# Patient Record
Sex: MALE | Race: WHITE | NOT HISPANIC OR LATINO
[De-identification: names, ages, dates, MRNs, and addresses within clinical notes are randomized per-mention and may not be internally consistent; named-entity substitution may affect disease eponyms.]

---

## 2017-04-04 ENCOUNTER — APPOINTMENT (OUTPATIENT)
Dept: PULMONOLOGY | Facility: CLINIC | Age: 77
End: 2017-04-04

## 2017-04-04 VITALS
HEART RATE: 59 BPM | SYSTOLIC BLOOD PRESSURE: 130 MMHG | BODY MASS INDEX: 30.65 KG/M2 | DIASTOLIC BLOOD PRESSURE: 70 MMHG | OXYGEN SATURATION: 92 % | WEIGHT: 226 LBS

## 2017-04-04 DIAGNOSIS — R91.8 OTHER NONSPECIFIC ABNORMAL FINDING OF LUNG FIELD: ICD-10-CM

## 2017-05-11 ENCOUNTER — APPOINTMENT (OUTPATIENT)
Dept: PULMONOLOGY | Facility: CLINIC | Age: 77
End: 2017-05-11

## 2017-05-11 VITALS — SYSTOLIC BLOOD PRESSURE: 124 MMHG | DIASTOLIC BLOOD PRESSURE: 66 MMHG | OXYGEN SATURATION: 95 % | HEART RATE: 61 BPM

## 2017-05-11 VITALS — WEIGHT: 223 LBS | BODY MASS INDEX: 30.24 KG/M2

## 2017-08-08 ENCOUNTER — APPOINTMENT (OUTPATIENT)
Dept: PULMONOLOGY | Facility: CLINIC | Age: 77
End: 2017-08-08

## 2017-08-10 DIAGNOSIS — Z78.9 OTHER SPECIFIED HEALTH STATUS: ICD-10-CM

## 2017-08-10 DIAGNOSIS — M10.9 GOUT, UNSPECIFIED: ICD-10-CM

## 2017-08-29 PROBLEM — M10.9 GOUT: Status: ACTIVE | Noted: 2017-08-29

## 2017-09-12 ENCOUNTER — APPOINTMENT (OUTPATIENT)
Dept: NEUROLOGY | Facility: CLINIC | Age: 77
End: 2017-09-12
Payer: MEDICARE

## 2017-09-12 VITALS
BODY MASS INDEX: 29.8 KG/M2 | SYSTOLIC BLOOD PRESSURE: 115 MMHG | HEIGHT: 72 IN | DIASTOLIC BLOOD PRESSURE: 51 MMHG | WEIGHT: 220 LBS

## 2017-09-12 PROCEDURE — 99215 OFFICE O/P EST HI 40 MIN: CPT

## 2017-09-18 ENCOUNTER — OUTPATIENT (OUTPATIENT)
Dept: OUTPATIENT SERVICES | Facility: HOSPITAL | Age: 77
LOS: 1 days | End: 2017-09-18
Payer: MEDICARE

## 2017-09-18 DIAGNOSIS — M54.17 RADICULOPATHY, LUMBOSACRAL REGION: ICD-10-CM

## 2017-09-18 DIAGNOSIS — Z51.89 ENCOUNTER FOR OTHER SPECIFIED AFTERCARE: ICD-10-CM

## 2017-09-18 DIAGNOSIS — M79.2 NEURALGIA AND NEURITIS, UNSPECIFIED: ICD-10-CM

## 2017-10-23 PROCEDURE — 97140 MANUAL THERAPY 1/> REGIONS: CPT

## 2017-10-23 PROCEDURE — 97010 HOT OR COLD PACKS THERAPY: CPT

## 2017-10-23 PROCEDURE — 97163 PT EVAL HIGH COMPLEX 45 MIN: CPT

## 2017-10-23 PROCEDURE — 97750 PHYSICAL PERFORMANCE TEST: CPT

## 2017-10-23 PROCEDURE — 97535 SELF CARE MNGMENT TRAINING: CPT

## 2017-10-23 PROCEDURE — G8988: CPT | Mod: CJ

## 2017-10-23 PROCEDURE — G8978: CPT | Mod: CK

## 2017-10-23 PROCEDURE — 97165 OT EVAL LOW COMPLEX 30 MIN: CPT

## 2017-10-23 PROCEDURE — G8987: CPT | Mod: CK

## 2017-10-23 PROCEDURE — 97530 THERAPEUTIC ACTIVITIES: CPT

## 2017-10-23 PROCEDURE — 97112 NEUROMUSCULAR REEDUCATION: CPT

## 2017-10-23 PROCEDURE — 97110 THERAPEUTIC EXERCISES: CPT

## 2017-10-23 PROCEDURE — G8979: CPT | Mod: CJ

## 2018-02-21 ENCOUNTER — RX RENEWAL (OUTPATIENT)
Age: 78
End: 2018-02-21

## 2018-04-05 ENCOUNTER — RECORD ABSTRACTING (OUTPATIENT)
Age: 78
End: 2018-04-05

## 2018-04-05 RX ORDER — ALLOPURINOL 100 MG/1
100 TABLET ORAL DAILY
Refills: 0 | Status: ACTIVE | COMMUNITY
Start: 2016-10-31

## 2018-04-05 RX ORDER — AMOXICILLIN 875 MG/1
875 TABLET, FILM COATED ORAL
Refills: 0 | Status: DISCONTINUED | COMMUNITY
End: 2018-04-05

## 2018-04-12 ENCOUNTER — APPOINTMENT (OUTPATIENT)
Dept: CARDIOLOGY | Facility: CLINIC | Age: 78
End: 2018-04-12
Payer: MEDICARE

## 2018-04-12 VITALS
SYSTOLIC BLOOD PRESSURE: 130 MMHG | RESPIRATION RATE: 16 BRPM | HEIGHT: 72 IN | DIASTOLIC BLOOD PRESSURE: 75 MMHG | BODY MASS INDEX: 28.17 KG/M2 | WEIGHT: 208 LBS

## 2018-04-12 DIAGNOSIS — R25.1 TREMOR, UNSPECIFIED: ICD-10-CM

## 2018-04-12 DIAGNOSIS — G43.909 MIGRAINE, UNSPECIFIED, NOT INTRACTABLE, W/OUT STATUS MIGRAINOSUS: ICD-10-CM

## 2018-04-12 PROCEDURE — 99214 OFFICE O/P EST MOD 30 MIN: CPT

## 2018-04-12 PROCEDURE — 93000 ELECTROCARDIOGRAM COMPLETE: CPT

## 2018-04-12 RX ORDER — CEFUROXIME AXETIL 500 MG/1
500 TABLET ORAL
Qty: 20 | Refills: 0 | Status: DISCONTINUED | COMMUNITY
Start: 2017-10-18

## 2018-04-12 RX ORDER — BLOOD SUGAR DIAGNOSTIC
STRIP MISCELLANEOUS
Qty: 300 | Refills: 0 | Status: ACTIVE | COMMUNITY
Start: 2018-04-05

## 2018-04-12 RX ORDER — OXYCODONE HYDROCHLORIDE 5 MG/1
5 CAPSULE ORAL
Refills: 0 | Status: DISCONTINUED | COMMUNITY
End: 2018-04-12

## 2018-04-12 RX ORDER — PREDNISONE 20 MG/1
20 TABLET ORAL
Qty: 12 | Refills: 0 | Status: DISCONTINUED | COMMUNITY
Start: 2017-11-11

## 2018-04-12 RX ORDER — TESTOSTERONE 25 MG/2.5G
25 GEL TRANSDERMAL
Refills: 0 | Status: DISCONTINUED | COMMUNITY
End: 2018-04-12

## 2018-04-12 RX ORDER — TAMSULOSIN HYDROCHLORIDE 0.4 MG/1
0.4 CAPSULE ORAL
Qty: 90 | Refills: 0 | Status: DISCONTINUED | COMMUNITY
Start: 2017-06-13 | End: 2018-04-12

## 2018-04-12 RX ORDER — PREDNISONE 5 MG/1
5 TABLET ORAL
Qty: 3 | Refills: 0 | Status: DISCONTINUED | COMMUNITY
Start: 2018-01-02

## 2018-04-12 RX ORDER — DIAZEPAM 10 MG/1
10 TABLET ORAL
Refills: 0 | Status: DISCONTINUED | COMMUNITY
End: 2018-04-12

## 2018-04-12 RX ORDER — PREDNISONE 10 MG/1
10 TABLET ORAL
Qty: 9 | Refills: 0 | Status: DISCONTINUED | COMMUNITY
Start: 2018-01-02

## 2018-04-12 RX ORDER — FUROSEMIDE 40 MG/1
40 TABLET ORAL
Qty: 90 | Refills: 0 | Status: DISCONTINUED | COMMUNITY
Start: 2017-05-17 | End: 2018-04-12

## 2018-05-22 ENCOUNTER — APPOINTMENT (OUTPATIENT)
Dept: CARDIOLOGY | Facility: CLINIC | Age: 78
End: 2018-05-22
Payer: MEDICARE

## 2018-05-22 PROCEDURE — 78452 HT MUSCLE IMAGE SPECT MULT: CPT

## 2018-05-22 PROCEDURE — 93015 CV STRESS TEST SUPVJ I&R: CPT

## 2018-05-22 PROCEDURE — A9500: CPT

## 2018-05-22 RX ORDER — REGADENOSON 0.08 MG/ML
0.4 INJECTION, SOLUTION INTRAVENOUS
Qty: 1 | Refills: 0 | Status: COMPLETED | OUTPATIENT
Start: 2018-05-22

## 2018-05-22 RX ADMIN — REGADENOSON 0 MG/5ML: 0.08 INJECTION, SOLUTION INTRAVENOUS at 00:00

## 2018-05-25 RX ORDER — KIT FOR THE PREPARATION OF TECHNETIUM TC99M SESTAMIBI 1 MG/5ML
INJECTION, POWDER, LYOPHILIZED, FOR SOLUTION PARENTERAL
Refills: 0 | Status: COMPLETED | OUTPATIENT
Start: 2018-05-25

## 2018-05-25 RX ADMIN — KIT FOR THE PREPARATION OF TECHNETIUM TC99M SESTAMIBI 0: 1 INJECTION, POWDER, LYOPHILIZED, FOR SOLUTION PARENTERAL at 00:00

## 2018-06-07 ENCOUNTER — APPOINTMENT (OUTPATIENT)
Dept: CARDIOLOGY | Facility: CLINIC | Age: 78
End: 2018-06-07
Payer: MEDICARE

## 2018-06-07 VITALS
WEIGHT: 208 LBS | RESPIRATION RATE: 18 BRPM | HEART RATE: 60 BPM | BODY MASS INDEX: 28.17 KG/M2 | SYSTOLIC BLOOD PRESSURE: 115 MMHG | DIASTOLIC BLOOD PRESSURE: 78 MMHG | HEIGHT: 72 IN

## 2018-06-07 DIAGNOSIS — Z01.818 ENCOUNTER FOR OTHER PREPROCEDURAL EXAMINATION: ICD-10-CM

## 2018-06-07 PROCEDURE — 99214 OFFICE O/P EST MOD 30 MIN: CPT

## 2018-06-07 PROCEDURE — 93000 ELECTROCARDIOGRAM COMPLETE: CPT

## 2018-07-23 PROBLEM — Z78.9 ALCOHOL USE: Status: ACTIVE | Noted: 2017-08-29

## 2018-09-19 ENCOUNTER — RX RENEWAL (OUTPATIENT)
Age: 78
End: 2018-09-19

## 2018-10-11 ENCOUNTER — APPOINTMENT (OUTPATIENT)
Dept: CARDIOLOGY | Facility: CLINIC | Age: 78
End: 2018-10-11
Payer: MEDICARE

## 2018-10-11 VITALS
HEART RATE: 55 BPM | WEIGHT: 208 LBS | SYSTOLIC BLOOD PRESSURE: 98 MMHG | DIASTOLIC BLOOD PRESSURE: 62 MMHG | BODY MASS INDEX: 28.17 KG/M2 | HEIGHT: 72 IN | RESPIRATION RATE: 18 BRPM

## 2018-10-11 PROCEDURE — 93000 ELECTROCARDIOGRAM COMPLETE: CPT

## 2018-10-11 PROCEDURE — 99214 OFFICE O/P EST MOD 30 MIN: CPT

## 2019-02-12 ENCOUNTER — APPOINTMENT (OUTPATIENT)
Dept: CARDIOLOGY | Facility: CLINIC | Age: 79
End: 2019-02-12

## 2019-04-16 ENCOUNTER — APPOINTMENT (OUTPATIENT)
Dept: CARDIOLOGY | Facility: CLINIC | Age: 79
End: 2019-04-16
Payer: MEDICARE

## 2019-04-16 ENCOUNTER — NON-APPOINTMENT (OUTPATIENT)
Age: 79
End: 2019-04-16

## 2019-04-16 ENCOUNTER — MEDICATION RENEWAL (OUTPATIENT)
Age: 79
End: 2019-04-16

## 2019-04-16 VITALS
DIASTOLIC BLOOD PRESSURE: 60 MMHG | SYSTOLIC BLOOD PRESSURE: 118 MMHG | HEIGHT: 72 IN | HEART RATE: 65 BPM | BODY MASS INDEX: 29.26 KG/M2 | WEIGHT: 216 LBS | RESPIRATION RATE: 16 BRPM

## 2019-04-16 DIAGNOSIS — N40.0 BENIGN PROSTATIC HYPERPLASIA WITHOUT LOWER URINARY TRACT SYMPMS: ICD-10-CM

## 2019-04-16 DIAGNOSIS — G62.9 POLYNEUROPATHY, UNSPECIFIED: ICD-10-CM

## 2019-04-16 PROCEDURE — 99214 OFFICE O/P EST MOD 30 MIN: CPT

## 2019-04-16 PROCEDURE — 93000 ELECTROCARDIOGRAM COMPLETE: CPT

## 2019-04-16 RX ORDER — TAMSULOSIN HYDROCHLORIDE 0.4 MG/1
0.4 CAPSULE ORAL AT BEDTIME
Refills: 0 | Status: ACTIVE | COMMUNITY

## 2019-04-16 RX ORDER — ENALAPRIL MALEATE 10 MG/1
10 TABLET ORAL DAILY
Qty: 90 | Refills: 0 | Status: DISCONTINUED | COMMUNITY
Start: 2017-05-16 | End: 2019-04-16

## 2019-04-16 RX ORDER — INSULIN ASPART 100 [IU]/ML
100 INJECTION, SOLUTION INTRAVENOUS; SUBCUTANEOUS
Refills: 0 | Status: DISCONTINUED | COMMUNITY
End: 2019-04-16

## 2019-04-16 NOTE — REASON FOR VISIT
[FreeTextEntry1] : \par Patient returns here for followup with regard to management of problems which include:\par \par 1. Obstructive sleep apnea\par \par 2. Orthostatic hypotension\par \par 3. Hyperlipidemia\par \par 4. Chronic kidney disease\par \par 5. Nonsustained ventricular tachycardia\par \par 6. History of Inferior MI.

## 2019-04-16 NOTE — ASSESSMENT
[FreeTextEntry1] : ECG: sinus bradycardia at 65 with normal axis intervals and no significant ST-T wave changes.\par \par Laboratory data 1/10/19\par Cholesterol 120\par HDL 38\par LDL 51\par Creatinine 1.5\par \par 5/22/18 ;A pharmacologic stress test performed did not show any evidence of ischemia. Artifact limited the study a bit. There were occasional PVCs. Comparison with the prior study revealed no interval changes.\par \par Impression:\par 1. The chest pain fairly atypical in nature has resolved. Nuclear stress testing does not show any evidence of ischemia and I  reassured the patient this\par 2. Orthostatic hypotension is much improved.\par 3. reportedly had some recurrent edema.No t seen today\par 4. Significant mixed hyperlipidemia now back on simvastatin and Zetia and improved \par 5. Uncontrolled diabetes mellitus.\par \par Plan:\par \par Will change Furosemide to TIW because of the profound orthostatic hypotension he had when on diuretics previously\par Watch the Creatinine which was 1.5  1/10/19\par Continue   simvastatin with marked improvement in the cholesterol noted \par Much stricter dietary control\par Instructed the patient about the benefits of a diet that restricts portion sizes, increases frequency of meals and consists of vegetables, (more green and leafy),fruit and nuts, whole grains, lean proteins and limits carbohydrates and meat and dairy fats\par Followup with primary care regarding the uncontrolled diabetes\par

## 2019-04-16 NOTE — PHYSICAL EXAM
[General Appearance - Well Developed] : well developed [Normal Appearance] : normal appearance [General Appearance - Well Nourished] : well nourished [Normal Conjunctiva] : the conjunctiva exhibited no abnormalities [Eyelids - No Xanthelasma] : the eyelids demonstrated no xanthelasmas [Normal Oral Mucosa] : normal oral mucosa [No Oral Pallor] : no oral pallor [No Oral Cyanosis] : no oral cyanosis [Normal Jugular Venous A Waves Present] : normal jugular venous A waves present [Normal Jugular Venous V Waves Present] : normal jugular venous V waves present [No Jugular Venous Bruno A Waves] : no jugular venous bruno A waves [Respiration, Rhythm And Depth] : normal respiratory rhythm and effort [Exaggerated Use Of Accessory Muscles For Inspiration] : no accessory muscle use [Auscultation Breath Sounds / Voice Sounds] : lungs were clear to auscultation bilaterally [Abnormal Walk] : normal gait [Nail Clubbing] : no clubbing of the fingernails [Cyanosis, Localized] : no localized cyanosis [Petechial Hemorrhages (___cm)] : no petechial hemorrhages [Skin Color & Pigmentation] : normal skin color and pigmentation [] : no rash [No Venous Stasis] : no venous stasis [Skin Lesions] : no skin lesions [No Skin Ulcers] : no skin ulcer [No Xanthoma] : no  xanthoma was observed [Oriented To Time, Place, And Person] : oriented to person, place, and time [Affect] : the affect was normal [Mood] : the mood was normal [No Anxiety] : not feeling anxious [FreeTextEntry1] :  obese, soft, NT

## 2019-04-16 NOTE — HISTORY OF PRESENT ILLNESS
[FreeTextEntry1] : - Issues of dizziness with orthostatic hypotension had been less significant lately.\par - Edema  apparently worsened again and he was recently begun on Lasix 40 mg daily which he takes as needed\par - Activity level is markedly reduced because of his peripheral neuropathy.\par -  chest pain is no longer an issue\par - Sleep apnea is being treated with CPAP.\par

## 2019-06-24 ENCOUNTER — INBOUND DOCUMENT (OUTPATIENT)
Age: 79
End: 2019-06-24

## 2019-09-17 ENCOUNTER — APPOINTMENT (OUTPATIENT)
Dept: DERMATOLOGY | Facility: CLINIC | Age: 79
End: 2019-09-17
Payer: MEDICARE

## 2019-09-17 PROCEDURE — 99213 OFFICE O/P EST LOW 20 MIN: CPT | Mod: 25

## 2019-09-17 PROCEDURE — 17260 DSTRJ MAL LES T/A/L 0.5 CM/<: CPT

## 2019-11-14 ENCOUNTER — CLINICAL ADVICE (OUTPATIENT)
Age: 79
End: 2019-11-14

## 2019-11-14 ENCOUNTER — RECORD ABSTRACTING (OUTPATIENT)
Age: 79
End: 2019-11-14

## 2019-12-05 ENCOUNTER — RX RENEWAL (OUTPATIENT)
Age: 79
End: 2019-12-05

## 2019-12-05 ENCOUNTER — MEDICATION RENEWAL (OUTPATIENT)
Age: 79
End: 2019-12-05

## 2019-12-17 ENCOUNTER — APPOINTMENT (OUTPATIENT)
Dept: DERMATOLOGY | Facility: CLINIC | Age: 79
End: 2019-12-17
Payer: MEDICARE

## 2019-12-17 PROCEDURE — 99213 OFFICE O/P EST LOW 20 MIN: CPT | Mod: 25

## 2019-12-17 PROCEDURE — 17003 DESTRUCT PREMALG LES 2-14: CPT

## 2019-12-17 PROCEDURE — 17000 DESTRUCT PREMALG LESION: CPT

## 2019-12-19 ENCOUNTER — APPOINTMENT (OUTPATIENT)
Dept: CARDIOLOGY | Facility: CLINIC | Age: 79
End: 2019-12-19
Payer: MEDICARE

## 2019-12-19 ENCOUNTER — NON-APPOINTMENT (OUTPATIENT)
Age: 79
End: 2019-12-19

## 2019-12-19 VITALS
WEIGHT: 208 LBS | SYSTOLIC BLOOD PRESSURE: 130 MMHG | DIASTOLIC BLOOD PRESSURE: 80 MMHG | HEART RATE: 69 BPM | OXYGEN SATURATION: 95 % | BODY MASS INDEX: 28.17 KG/M2 | HEIGHT: 72 IN | RESPIRATION RATE: 14 BRPM

## 2019-12-19 DIAGNOSIS — I21.9 ACUTE MYOCARDIAL INFARCTION, UNSPECIFIED: ICD-10-CM

## 2019-12-19 PROCEDURE — 93000 ELECTROCARDIOGRAM COMPLETE: CPT

## 2019-12-19 PROCEDURE — 99214 OFFICE O/P EST MOD 30 MIN: CPT

## 2019-12-19 RX ORDER — ROPINIROLE 0.25 MG/1
0.25 TABLET, FILM COATED ORAL
Refills: 0 | Status: ACTIVE | COMMUNITY

## 2019-12-19 RX ORDER — FUROSEMIDE 40 MG/1
40 TABLET ORAL DAILY
Qty: 90 | Refills: 0 | Status: DISCONTINUED | COMMUNITY
End: 2019-12-19

## 2019-12-19 RX ORDER — INSULIN GLARGINE 300 U/ML
300 INJECTION, SOLUTION SUBCUTANEOUS DAILY
Refills: 0 | Status: DISCONTINUED | COMMUNITY
End: 2019-12-19

## 2019-12-19 RX ORDER — INSULIN DEGLUDEC INJECTION 100 U/ML
INJECTION, SOLUTION SUBCUTANEOUS DAILY
Refills: 0 | Status: DISCONTINUED | COMMUNITY
End: 2019-12-19

## 2019-12-19 RX ORDER — EZETIMIBE 10 MG/1
10 TABLET ORAL
Refills: 0 | Status: ACTIVE | COMMUNITY

## 2019-12-19 RX ORDER — PREGABALIN 100 MG/1
100 CAPSULE ORAL TWICE DAILY
Refills: 0 | Status: ACTIVE | COMMUNITY

## 2019-12-19 RX ORDER — PREGABALIN 150 MG/1
150 CAPSULE ORAL TWICE DAILY
Refills: 0 | Status: DISCONTINUED | COMMUNITY
End: 2019-12-19

## 2019-12-19 RX ORDER — SUMATRIPTAN 100 MG/1
100 TABLET, FILM COATED ORAL
Refills: 0 | Status: DISCONTINUED | COMMUNITY
End: 2019-12-19

## 2019-12-19 NOTE — HISTORY OF PRESENT ILLNESS
[FreeTextEntry1] : In November Mr. Morris called with concerns of syncopal episodes  x 2  while at work. This was not brought on by any exertion and had no associated symptoms. We instructed him to discontinue his HCTZ and this has not reoccurred. There is a longstanding history of syncope and orthostatic hypotension.\par \par Has continued Lasix 40 mg daily for l/e edema.\par Activity level is markedly reduced because of his peripheral neuropathy.\par Sleep apnea is being treated with CPAP.\par \par Denies any SOb, chest pain, palpations or orthopnea.\par \par Blood work collected by Dr. Rey not available for review.\par

## 2019-12-19 NOTE — ASSESSMENT
[FreeTextEntry1] : ECG: SR at 64, PRWP.\par \par Laboratory data 11/5/19:\par Cholesterol 218\par HDL 34\par \par Creatinine 1.4\par LFTs are normal\par Hemoglobin 12.9\par \par Laboratory data 1/10/19\par Chol 120\par HDL   38\par LDL   51\par Creatinine 1.5\par \par 5/22/18 ;A pharmacologic stress test performed did not show any evidence of ischemia. Artifact limited the study a bit. There were occasional PVCs. Comparison with the prior study revealed no interval changes.\par \par Impression: \par 1.History of chest pain with fairly atypical features .Nuclear stress testing does not show any evidence of       ischemia and I  reassured the patient of  this\par \par 2.  syncopal episodes in November with no hospitalization needed. HCTZ discontinued and has not reoccurred     since.  He has a well documented hx of orthostatic hypotension and syncope in the past and further eval is not likely to be productive\par \par 3. Mild right ankle edema with the   discontinuation of HCTZ, currently on Lasix 40mg QD. Denies any SOB.\par \par 4. Significant mixed hyperlipidemia improved with Simvastatin and tolerating.\par \par 5. Uncontrolled diabetes mellitus.\par \par 6. Blood pressure today 130/80 and acceptable. ECG SR and stable.\par \par 7. Down 8 lb since April.\par \par \par Plan:\par 1. Continue Lasix, HCTZ discontinued indefinitely. Knows to call if edema worsens.\par \par 2. Will obtain recent blood work to review renal function.\par \par 3.Continue simvastatin with marked improvement in the cholesterol noted \par \par 4.Much stricter dietary control, strict sodium intake.\par \par 5.Instructed the patient about the benefits of a diet that restricts portion sizes, increases frequency of meals and consists of vegetables, (more green and leafy),fruit and nuts, whole grains, lean proteins and limits carbohydrates and meat and dairy fats.\par \par 6. Exercise has been encouraged.\par \par 7. Repeat echo and carotid ultrasound prior to follow up.\par \par \par Clinical follow up in 4 months \par

## 2020-01-01 ENCOUNTER — APPOINTMENT (OUTPATIENT)
Dept: PULMONOLOGY | Facility: CLINIC | Age: 80
End: 2020-01-01
Payer: MEDICARE

## 2020-01-01 ENCOUNTER — APPOINTMENT (OUTPATIENT)
Dept: DERMATOLOGY | Facility: CLINIC | Age: 80
End: 2020-01-01
Payer: MEDICARE

## 2020-01-01 ENCOUNTER — APPOINTMENT (OUTPATIENT)
Dept: PULMONOLOGY | Facility: CLINIC | Age: 80
End: 2020-01-01

## 2020-01-01 ENCOUNTER — APPOINTMENT (OUTPATIENT)
Dept: CARDIOLOGY | Facility: CLINIC | Age: 80
End: 2020-01-01
Payer: MEDICARE

## 2020-01-01 ENCOUNTER — APPOINTMENT (OUTPATIENT)
Dept: CARDIOLOGY | Facility: CLINIC | Age: 80
End: 2020-01-01

## 2020-01-01 ENCOUNTER — NON-APPOINTMENT (OUTPATIENT)
Age: 80
End: 2020-01-01

## 2020-01-01 ENCOUNTER — RESULT REVIEW (OUTPATIENT)
Age: 80
End: 2020-01-01

## 2020-01-01 ENCOUNTER — TRANSCRIPTION ENCOUNTER (OUTPATIENT)
Age: 80
End: 2020-01-01

## 2020-01-01 ENCOUNTER — APPOINTMENT (OUTPATIENT)
Dept: OTOLARYNGOLOGY | Facility: CLINIC | Age: 80
End: 2020-01-01

## 2020-01-01 ENCOUNTER — RX RENEWAL (OUTPATIENT)
Age: 80
End: 2020-01-01

## 2020-01-01 VITALS
WEIGHT: 203 LBS | BODY MASS INDEX: 27.53 KG/M2 | DIASTOLIC BLOOD PRESSURE: 60 MMHG | OXYGEN SATURATION: 96 % | HEART RATE: 75 BPM | SYSTOLIC BLOOD PRESSURE: 118 MMHG

## 2020-01-01 VITALS
HEIGHT: 72 IN | OXYGEN SATURATION: 94 % | WEIGHT: 200 LBS | DIASTOLIC BLOOD PRESSURE: 60 MMHG | TEMPERATURE: 96.8 F | SYSTOLIC BLOOD PRESSURE: 110 MMHG | BODY MASS INDEX: 27.09 KG/M2 | HEART RATE: 66 BPM | RESPIRATION RATE: 16 BRPM

## 2020-01-01 VITALS
HEART RATE: 44 BPM | OXYGEN SATURATION: 97 % | BODY MASS INDEX: 27.5 KG/M2 | TEMPERATURE: 97.6 F | RESPIRATION RATE: 16 BRPM | SYSTOLIC BLOOD PRESSURE: 105 MMHG | WEIGHT: 203 LBS | HEIGHT: 72 IN | DIASTOLIC BLOOD PRESSURE: 60 MMHG

## 2020-01-01 VITALS — SYSTOLIC BLOOD PRESSURE: 138 MMHG | OXYGEN SATURATION: 95 % | DIASTOLIC BLOOD PRESSURE: 82 MMHG | HEART RATE: 86 BPM

## 2020-01-01 VITALS — HEART RATE: 56 BPM | SYSTOLIC BLOOD PRESSURE: 120 MMHG | DIASTOLIC BLOOD PRESSURE: 60 MMHG | OXYGEN SATURATION: 96 %

## 2020-01-01 VITALS — WEIGHT: 201 LBS | BODY MASS INDEX: 27.26 KG/M2

## 2020-01-01 DIAGNOSIS — G47.33 OBSTRUCTIVE SLEEP APNEA (ADULT) (PEDIATRIC): ICD-10-CM

## 2020-01-01 DIAGNOSIS — N18.9 CHRONIC KIDNEY DISEASE, UNSPECIFIED: ICD-10-CM

## 2020-01-01 DIAGNOSIS — E11.9 TYPE 2 DIABETES MELLITUS W/OUT COMPLICATIONS: ICD-10-CM

## 2020-01-01 DIAGNOSIS — R60.0 LOCALIZED EDEMA: ICD-10-CM

## 2020-01-01 DIAGNOSIS — Z99.89 OBSTRUCTIVE SLEEP APNEA (ADULT) (PEDIATRIC): ICD-10-CM

## 2020-01-01 DIAGNOSIS — C44.519 BASAL CELL CARCINOMA OF SKIN OF OTHER PART OF TRUNK: ICD-10-CM

## 2020-01-01 DIAGNOSIS — R55 SYNCOPE AND COLLAPSE: ICD-10-CM

## 2020-01-01 DIAGNOSIS — I49.9 CARDIAC ARRHYTHMIA, UNSPECIFIED: ICD-10-CM

## 2020-01-01 DIAGNOSIS — I95.1 ORTHOSTATIC HYPOTENSION: ICD-10-CM

## 2020-01-01 DIAGNOSIS — R00.1 BRADYCARDIA, UNSPECIFIED: ICD-10-CM

## 2020-01-01 DIAGNOSIS — J18.9 PNEUMONIA, UNSPECIFIED ORGANISM: ICD-10-CM

## 2020-01-01 DIAGNOSIS — K21.9 GASTRO-ESOPHAGEAL REFLUX DISEASE W/OUT ESOPHAGITIS: ICD-10-CM

## 2020-01-01 DIAGNOSIS — R07.9 CHEST PAIN, UNSPECIFIED: ICD-10-CM

## 2020-01-01 DIAGNOSIS — E78.5 HYPERLIPIDEMIA, UNSPECIFIED: ICD-10-CM

## 2020-01-01 PROCEDURE — 93000 ELECTROCARDIOGRAM COMPLETE: CPT

## 2020-01-01 PROCEDURE — 17314 MOHS ADDL STAGE T/A/L: CPT

## 2020-01-01 PROCEDURE — 99214 OFFICE O/P EST MOD 30 MIN: CPT | Mod: 25

## 2020-01-01 PROCEDURE — 99204 OFFICE O/P NEW MOD 45 MIN: CPT

## 2020-01-01 PROCEDURE — 99214 OFFICE O/P EST MOD 30 MIN: CPT

## 2020-01-01 PROCEDURE — ZZZZZ: CPT

## 2020-01-01 PROCEDURE — 12032 INTMD RPR S/A/T/EXT 2.6-7.5: CPT

## 2020-01-01 PROCEDURE — 11102 TANGNTL BX SKIN SINGLE LES: CPT

## 2020-01-01 PROCEDURE — 99443: CPT | Mod: CR

## 2020-01-01 PROCEDURE — 17313 MOHS 1 STAGE T/A/L: CPT

## 2020-01-01 RX ORDER — HYDROCHLOROTHIAZIDE 25 MG/1
25 TABLET ORAL DAILY
Qty: 90 | Refills: 3 | Status: ACTIVE | COMMUNITY
Start: 1900-01-01 | End: 1900-01-01

## 2020-01-01 RX ORDER — INSULIN ASPART 100 [IU]/ML
100 INJECTION, SOLUTION INTRAVENOUS; SUBCUTANEOUS
Refills: 0 | Status: ACTIVE | COMMUNITY
Start: 2020-01-01

## 2020-01-01 RX ORDER — INSULIN GLARGINE 300 U/ML
300 INJECTION, SOLUTION SUBCUTANEOUS DAILY
Refills: 0 | Status: DISCONTINUED | COMMUNITY
End: 2020-01-01

## 2020-01-01 RX ORDER — MIDODRINE HYDROCHLORIDE 10 MG/1
10 TABLET ORAL 3 TIMES DAILY
Qty: 270 | Refills: 1 | Status: ACTIVE | COMMUNITY
Start: 2017-08-18 | End: 1900-01-01

## 2020-01-01 RX ORDER — INSULIN LISPRO 100 [IU]/ML
INJECTION, SOLUTION INTRAVENOUS; SUBCUTANEOUS
Refills: 0 | Status: DISCONTINUED | COMMUNITY
End: 2020-01-01

## 2020-01-01 RX ORDER — ROPINIROLE 0.25 MG/1
0.25 TABLET, FILM COATED ORAL
Refills: 0 | Status: DISCONTINUED | COMMUNITY
End: 2020-01-01

## 2020-01-01 RX ORDER — INSULIN DEGLUDEC INJECTION 100 U/ML
100 INJECTION, SOLUTION SUBCUTANEOUS
Refills: 0 | Status: ACTIVE | COMMUNITY

## 2020-01-01 RX ORDER — HYDROCHLOROTHIAZIDE 12.5 MG/1
12.5 TABLET ORAL DAILY
Qty: 90 | Refills: 3 | Status: DISCONTINUED | COMMUNITY
End: 2020-01-01

## 2020-04-03 NOTE — PHYSICAL EXAM
[General Appearance - Well Developed] : well developed [Normal Appearance] : normal appearance [General Appearance - Well Nourished] : well nourished [Normal Conjunctiva] : the conjunctiva exhibited no abnormalities [Eyelids - No Xanthelasma] : the eyelids demonstrated no xanthelasmas [Normal Oral Mucosa] : normal oral mucosa [No Oral Pallor] : no oral pallor [No Oral Cyanosis] : no oral cyanosis [Normal Jugular Venous A Waves Present] : normal jugular venous A waves present [Normal Jugular Venous V Waves Present] : normal jugular venous V waves present [No Jugular Venous Bruno A Waves] : no jugular venous bruno A waves [Respiration, Rhythm And Depth] : normal respiratory rhythm and effort [Exaggerated Use Of Accessory Muscles For Inspiration] : no accessory muscle use [Auscultation Breath Sounds / Voice Sounds] : lungs were clear to auscultation bilaterally [FreeTextEntry1] :  obese, soft, NT [Abnormal Walk] : normal gait [Nail Clubbing] : no clubbing of the fingernails [Cyanosis, Localized] : no localized cyanosis [Petechial Hemorrhages (___cm)] : no petechial hemorrhages [Skin Color & Pigmentation] : normal skin color and pigmentation [] : no rash [No Venous Stasis] : no venous stasis [Skin Lesions] : no skin lesions [No Skin Ulcers] : no skin ulcer [No Xanthoma] : no  xanthoma was observed [Oriented To Time, Place, And Person] : oriented to person, place, and time [Affect] : the affect was normal [Mood] : the mood was normal [No Anxiety] : not feeling anxious

## 2020-05-07 PROBLEM — G47.33 OSA ON CPAP: Status: ACTIVE | Noted: 2018-04-12

## 2020-05-12 NOTE — ASSESSMENT
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

## 2020-05-12 NOTE — PHYSICAL EXAM
[Heart Rate And Rhythm] : heart rate and rhythm were normal [Jugular Venous Distention Increased] : there was no jugular-venous distention [Auscultation Breath Sounds / Voice Sounds] : lungs were clear to auscultation bilaterally [] : no respiratory distress [Abdomen Soft] : soft [Abdomen Tenderness] : non-tender [No Focal Deficits] : no focal deficits [Cyanosis, Localized] : no localized cyanosis [Nail Clubbing] : no clubbing of the fingernails [FreeTextEntry1] : No edema, calf tenderness, or cords

## 2020-05-12 NOTE — HISTORY OF PRESENT ILLNESS
[FreeTextEntry1] :          80 yo distant smoker and retired  who I followed for small pulmonary nodules and ilsa from about 2002 through 2004 seen for incidental finding of small pulmonary nodules on abdominal CT.\par Snoring, EDS, ?RLS and non-restorative sleep noted  Re diagnosed with severe ILSA in 2015.\par   Nocturnal nasal cpap at 9 cm H20 ordered. Not using CPAP recently\par Want to know about Inspire procedure\par  FH of DM and heart disease, but not cancer.  No significant exposures or travel.   No respiratory symptoms. .\par \par \par

## 2020-05-12 NOTE — CONSULT LETTER
[Consult Letter:] : I had the pleasure of evaluating your patient, [unfilled]. [Dear  ___] : Dear  [unfilled], [Sincerely,] : Sincerely, [Consult Closing:] : Thank you very much for allowing me to participate in the care of this patient.  If you have any questions, please do not hesitate to contact me. [Please see my note below.] : Please see my note below. [Adalberto Strauss MD] : Adalberto Strauss MD

## 2020-06-12 PROBLEM — J18.9 COMMUNITY ACQUIRED PNEUMONIA: Status: ACTIVE | Noted: 2020-01-01

## 2020-06-12 NOTE — ASSESSMENT
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

## 2020-06-12 NOTE — CONSULT LETTER
[Dear  ___] : Dear  [unfilled], [Consult Letter:] : I had the pleasure of evaluating your patient, [unfilled]. [Consult Closing:] : Thank you very much for allowing me to participate in the care of this patient.  If you have any questions, please do not hesitate to contact me. [Please see my note below.] : Please see my note below. [Adalberto Strauss MD] : Adalberto Strauss MD [Sincerely,] : Sincerely,

## 2020-06-12 NOTE — PHYSICAL EXAM
[Jugular Venous Distention Increased] : there was no jugular-venous distention [Auscultation Breath Sounds / Voice Sounds] : lungs were clear to auscultation bilaterally [Heart Rate And Rhythm] : heart rate and rhythm were normal [] : no respiratory distress [Abdomen Tenderness] : non-tender [Abdomen Soft] : soft [No Focal Deficits] : no focal deficits [Nail Clubbing] : no clubbing of the fingernails [Cyanosis, Localized] : no localized cyanosis [FreeTextEntry1] : Uvula swelling, enlarged tongue

## 2020-06-12 NOTE — HISTORY OF PRESENT ILLNESS
[FreeTextEntry1] :          80 yo distant smoker and retired  who I followed for small pulmonary nodules and ilsa from about 2002 through 2004 seen for incidental finding of small pulmonary nodules on abdominal CT.\par Snoring, EDS, ?RLS and non-restorative sleep noted  Re diagnosed with severe ILSA in 2015.\par   Nocturnal nasal cpap at 9 cm H20 ordered. Not using CPAP recently\par Want to know about Inspire procedure\par  FH of DM and heart disease, but not cancer.  No significant exposures or travel.   No respiratory symptoms. .\par \par \par 6/12/20\par LLL pna at [facility] 6/4-6/8/20\par Old sleep equipment functioning poorly

## 2020-06-23 PROBLEM — R07.9 CHEST PAIN SYNDROME: Status: ACTIVE | Noted: 2018-04-12

## 2020-06-23 PROBLEM — I49.9 ARRHYTHMIA: Status: ACTIVE | Noted: 2017-08-29

## 2020-06-23 NOTE — ASSESSMENT
[FreeTextEntry1] : ECG: SR at 66, PRWP.  1' AVB \par \par Laboratory data 6/6/20:\par BUN 35\par Creatinine 1.4\par Hemoglobin 11\par A1c 8.0\par \par \par Laboratory data 11/5/19:\par Cholesterol 218\par HDL 34\par \par Creatinine 1.4\par LFTs are normal\par Hemoglobin 12.9\par \par Laboratory data 1/10/19\par Chol 120\par HDL   38\par LDL   51\par Creatinine 1.5\par \par 5/22/18 ;A pharmacologic stress test performed did not show any evidence of ischemia. Artifact limited the study a bit. There were occasional PVCs. Comparison with the prior study revealed no interval changes.\par \par Impression: \par 1.History of chest pain with fairly atypical features .Nuclear stress testing does not show any evidence of       ischemia and I  reassured the patient of  this\par \par 2.  syncopal episodes in November with no hospitalization needed. HCTZ resumed at low dose and has not reoccurred     since.  He has a well documented hx of orthostatic hypotension and syncope in the past and further eval is not likely to be productive\par \par 3. S/p LLL Pna with improvement\par \par 4. Significant mixed hyperlipidemia improved with Simvastatin and tolerating.\par \par 5. Uncontrolled diabetes mellitus.\par \par 6. Blood pressure stablee. ECG SR and stable.\par \par \par \par Plan:\par 1. Continue Lasix, HCTZ discontinued indefinitely. Knows to call if edema worsens.\par \par 2. Will obtain recent blood work to review renal function.\par \par 3.Continue simvastatin with marked improvement in the cholesterol noted \par \par 4.Much stricter dietary control, strict sodium intake.\par \par 5.Instructed the patient about the benefits of a diet that restricts portion sizes, increases frequency of meals and consists of vegetables, (more green and leafy),fruit and nuts, whole grains, lean proteins and limits carbohydrates and meat and dairy fats.\par \par 6. Exercise has been encouraged.\par \par 7. Repeat echo and carotid ultrasound prior to follow up.\par \par \par Clinical follow up in 4 months \par

## 2020-06-23 NOTE — PHYSICAL EXAM
[General Appearance - Well Developed] : well developed [General Appearance - Well Nourished] : well nourished [Normal Appearance] : normal appearance [Normal Conjunctiva] : the conjunctiva exhibited no abnormalities [Eyelids - No Xanthelasma] : the eyelids demonstrated no xanthelasmas [No Oral Pallor] : no oral pallor [No Oral Cyanosis] : no oral cyanosis [Normal Oral Mucosa] : normal oral mucosa [Normal Jugular Venous A Waves Present] : normal jugular venous A waves present [Normal Jugular Venous V Waves Present] : normal jugular venous V waves present [Respiration, Rhythm And Depth] : normal respiratory rhythm and effort [No Jugular Venous Bruno A Waves] : no jugular venous bruno A waves [Exaggerated Use Of Accessory Muscles For Inspiration] : no accessory muscle use [Auscultation Breath Sounds / Voice Sounds] : lungs were clear to auscultation bilaterally [Abnormal Walk] : normal gait [Cyanosis, Localized] : no localized cyanosis [Petechial Hemorrhages (___cm)] : no petechial hemorrhages [Nail Clubbing] : no clubbing of the fingernails [Skin Color & Pigmentation] : normal skin color and pigmentation [] : no ischemic changes [Skin Lesions] : no skin lesions [No Venous Stasis] : no venous stasis [No Skin Ulcers] : no skin ulcer [No Xanthoma] : no  xanthoma was observed [Oriented To Time, Place, And Person] : oriented to person, place, and time [Mood] : the mood was normal [Affect] : the affect was normal [No Anxiety] : not feeling anxious [FreeTextEntry1] : Cardiac:\par Nl S1 S2 with a grade 1/6  ARIANE and no significant  gallops or rubs. Mild right ankle edema.

## 2020-06-23 NOTE — HISTORY OF PRESENT ILLNESS
[FreeTextEntry1] : Patient was just hospitalized at [facility] for several days with a left lower lobe pneumonia. Has convalesced well.\par Was reportedly negative for Covid 19  virus\par Feels that his back to his baseline.\par \par In November Mr. Morris called with concerns of syncopal episodes  x 2  while at work. This was not brought on by any exertion and had no associated symptoms. We instructed him to discontinue his HCTZ and this has not reoccurred. There is a longstanding history of syncope and orthostatic hypotension.\par \par On HCTZ for edema\par Activity level is markedly reduced because of his peripheral neuropathy.\par Sleep apnea is being treated with CPAP.\par \par Denies any SOb, chest pain, palpations or orthopnea.\par

## 2020-07-15 PROBLEM — K21.9 GERD (GASTROESOPHAGEAL REFLUX DISEASE): Status: ACTIVE | Noted: 2018-04-05

## 2020-07-15 NOTE — ASSESSMENT
[FreeTextEntry1] : OK post pneumonia\par Small, benign  lung nodules\par No lung cancer risks\par Severe ilsa without desaturation or arrhythmia well controlled on nocturnal full face cpap at 9 cm H20 in the past\par Wants retry CPAP \par

## 2020-07-15 NOTE — CONSULT LETTER
[Dear  ___] : Dear  [unfilled], [Consult Letter:] : I had the pleasure of evaluating your patient, [unfilled]. [Please see my note below.] : Please see my note below. [Consult Closing:] : Thank you very much for allowing me to participate in the care of this patient.  If you have any questions, please do not hesitate to contact me. [Sincerely,] : Sincerely, [Adalberto Strauss MD] : Adalberto Strauss MD

## 2020-07-15 NOTE — PHYSICAL EXAM
[Jugular Venous Distention Increased] : there was no jugular-venous distention [Heart Rate And Rhythm] : heart rate and rhythm were normal [] : no respiratory distress [Auscultation Breath Sounds / Voice Sounds] : lungs were clear to auscultation bilaterally [Abdomen Soft] : soft [Abdomen Tenderness] : non-tender [No Focal Deficits] : no focal deficits [Nail Clubbing] : no clubbing of the fingernails [Cyanosis, Localized] : no localized cyanosis [FreeTextEntry1] : No edema, calf tenderness, or cords

## 2020-07-15 NOTE — HISTORY OF PRESENT ILLNESS
[FreeTextEntry1] :          78 yo distant smoker and retired  who I followed for small pulmonary nodules and ilsa from about 2002 through 2004 seen for incidental finding of small pulmonary nodules on abdominal CT.\par Snoring, EDS, ?RLS and non-restorative sleep noted  Re diagnosed with severe ILSA in 2015.\par   Nocturnal nasal cpap at 9 cm H20 ordered. Not using CPAP recently\par Want to know about Inspire procedure\par  FH of DM and heart disease, but not cancer.  No significant exposures or travel.   No respiratory symptoms. .\par \par \par 6/12/20\par LLL pna at [facility] 6/4-6/8/20\par Old sleep equipment functioning poorly

## 2020-08-13 PROBLEM — C44.519 BCC (BASAL CELL CARCINOMA), CHEST: Status: ACTIVE | Noted: 2020-01-01

## 2020-11-12 PROBLEM — R55 SYNCOPE AND COLLAPSE: Status: ACTIVE | Noted: 2019-12-19

## 2020-11-12 PROBLEM — N18.9 CHRONIC KIDNEY DISEASE: Status: ACTIVE | Noted: 2018-04-05

## 2020-11-12 PROBLEM — R60.0 LOCAL EDEMA: Status: ACTIVE | Noted: 2018-04-05

## 2020-11-12 PROBLEM — R00.1 BRADYCARDIA: Status: ACTIVE | Noted: 2020-01-01

## 2020-11-12 PROBLEM — I95.1 CHRONIC ORTHOSTATIC HYPOTENSION: Status: ACTIVE | Noted: 2018-04-12

## 2020-11-12 PROBLEM — E78.5 HYPERLIPIDEMIA: Status: ACTIVE | Noted: 2017-08-29

## 2020-11-12 PROBLEM — G47.33 OSA (OBSTRUCTIVE SLEEP APNEA): Status: ACTIVE | Noted: 2020-01-01

## 2020-11-12 NOTE — ASSESSMENT
[FreeTextEntry1] : ECG: Sinus bradycardia 44 bpm.  Poor R wave progression.  No significant ST-T wave changes.\par \par Laboratory data 6/6/20: Nothing more current\par BUN 35\par Creatinine 1.4\par Hemoglobin 11\par A1c 8.0\par \par \par Laboratory data 11/5/19:\par Cholesterol 218\par HDL 34\par \par Creatinine 1.4\par LFTs are normal\par Hemoglobin 12.9\par \par Laboratory data 1/10/19\par Chol 120\par HDL   38\par LDL   51\par Creatinine 1.5\par \par 5/22/18 ;A pharmacologic stress test performed did not show any evidence of ischemia. Artifact limited the study a bit. There were occasional PVCs. Comparison with the prior study revealed no interval changes.\par \par Impression: \par 1.bradycardia seen during hospitalization.\par    On rechallenge with low-dose metoprolol clearly still very bradycardic.\par    Holter monitor demonstrates an average heart rate of 46 and never higher than 65.\par \par 2.  syncopal episodes in November '19 with no hospitalization needed. HCTZ resumed at low dose and has not           reoccurred  since.  He has a well documented hx of orthostatic hypotension and syncope in the past and         further eval is not likely to be productive\par \par 3.  Bilateral lower extremity infections on the right ankle and the left foot toes.  Being followed by podiatry.\par      Substantial increase in lower extremity edema might be contributing to the lack of healing.\par \par 4. Significant mixed hyperlipidemia improved with Simvastatin and tolerating.\par \par 5. Uncontrolled diabetes mellitus.\par \par 6. Blood pressure stable. ECG SR and stable.\par \par 7.  Preop evaluation for colonoscopy\par \par Plan:\par 1.  We will increase hydrochlorothiazide to 25 mg daily.\par \par 2. Will obtain recent blood work to review renal function.\par \par 3.Continue simvastatin with marked improvement in the cholesterol noted \par \par 4.Much stricter dietary control, strict sodium intake.\par \par 5.Instructed the patient about the benefits of a diet that restricts portion sizes, increases frequency of meals and consists of vegetables, (more green and leafy),fruit and nuts, whole grains, lean proteins and limits carbohydrates and meat and dairy fats.\par \par 6. Exercise has been encouraged.\par \par 7.  We will increase midodrine to 10 mg p.o. twice daily.\par \par 8.  We will discontinue metoprolol indefinitely.\par May need to reassess the chronotropic competence at a later date.\par \par 9.  No absolute cardiac contraindications to proceeding with colonoscopy at this point in time though would like to see repeat monitoring off of Toprol before hand.\par \par Clinical follow up in 4 months \par

## 2020-11-12 NOTE — HISTORY OF PRESENT ILLNESS
[FreeTextEntry1] : Patient was just hospitalized at [facility] for several days with bilateral lower extremity foot infections.. Has convalesced well.\par Was reportedly negative for Covid 19  virus\par Has had significant increase in lower extremity edema while taking hydrochlorothiazide 12.5 mg daily\par \par There is a longstanding history of syncope and orthostatic hypotension.\par \par In the hospital was noted to have significant bradycardia on the monitor and metoprolol was discontinued.\par We brought him back in on metoprolol to do a 24-hour Holter monitor.  Because of his history of atrial and ventricular arrhythmias, coronary artery disease, wanted to try to maintain Toprol therapy if at all possible.\par \par On HCTZ for edema\par Activity level is markedly reduced because of his peripheral neuropathy.\par Sleep apnea is being treated with CPAP.\par \par Denies any SOb, chest pain, palpations or orthopnea.\par

## 2021-01-07 NOTE — PHYSICAL EXAM
Chemodenervation     Date/Time 1/7/2021 1:05 PM     Performed by  Carol Abreu MD     Authorized by Carol Abreu MD      Procedures     Botox Procedures: salivary gland disturbance      Indications: sialorrhea     Post-procedure details      Chemodenervation:  Head or face    Facial Nerve Location[de-identified]  Bilateral facial nerve       BOTOX INJECTION    Indication: Sialorrhea    100 units of Botox  From lot number [id number] with an expiration February 2023 were diluted with 2 mL of normal saline to produce a 50 unit per cc dilution  Utilizing aseptic technique a 30-gauge needle was utilized for injection purposes and the parotid glands reach injected bilaterally with 50 units of Botox in each gland - 30 units in the head of the parotid and 20 units in the tail of the parotid for a total of 100 units injected  Any minimal bleeding was controlled with compression  The patient tolerated the procedure well, and will followup with me in 2 weeks   Anticipate repeating Botox in 3 months [General Appearance - Well Developed] : well developed [Normal Appearance] : normal appearance [General Appearance - Well Nourished] : well nourished [Normal Conjunctiva] : the conjunctiva exhibited no abnormalities [Eyelids - No Xanthelasma] : the eyelids demonstrated no xanthelasmas [Normal Oral Mucosa] : normal oral mucosa [No Oral Pallor] : no oral pallor [No Oral Cyanosis] : no oral cyanosis [Normal Jugular Venous A Waves Present] : normal jugular venous A waves present [Normal Jugular Venous V Waves Present] : normal jugular venous V waves present [No Jugular Venous Bruno A Waves] : no jugular venous bruno A waves [Respiration, Rhythm And Depth] : normal respiratory rhythm and effort [Exaggerated Use Of Accessory Muscles For Inspiration] : no accessory muscle use [Auscultation Breath Sounds / Voice Sounds] : lungs were clear to auscultation bilaterally [Abnormal Walk] : normal gait [Cyanosis, Localized] : no localized cyanosis [Nail Clubbing] : no clubbing of the fingernails [Petechial Hemorrhages (___cm)] : no petechial hemorrhages [Skin Color & Pigmentation] : normal skin color and pigmentation [] : no rash [No Venous Stasis] : no venous stasis [Skin Lesions] : no skin lesions [No Skin Ulcers] : no skin ulcer [No Xanthoma] : no  xanthoma was observed [Affect] : the affect was normal [Oriented To Time, Place, And Person] : oriented to person, place, and time [Mood] : the mood was normal [No Anxiety] : not feeling anxious [FreeTextEntry1] :  obese, soft, NT

## 2021-03-08 ENCOUNTER — APPOINTMENT (OUTPATIENT)
Dept: CARDIOLOGY | Facility: CLINIC | Age: 81
End: 2021-03-08